# Patient Record
Sex: OTHER/UNKNOWN | Race: WHITE | NOT HISPANIC OR LATINO | Employment: OTHER | URBAN - METROPOLITAN AREA
[De-identification: names, ages, dates, MRNs, and addresses within clinical notes are randomized per-mention and may not be internally consistent; named-entity substitution may affect disease eponyms.]

---

## 2018-02-05 NOTE — PATIENT DISCUSSION
Trial framed mirtha MERCER and pt greatly apprecited. Very bothered by distance VA. Wants Mirtha MERCER. Poss LVC with DWS. Pt feels like he is non-adapting to monovision.

## 2018-02-13 NOTE — PATIENT DISCUSSION
Patient desires better uncorrected distance vision. Recommend LRI OD to treat residual astigmatism. Recommend contact lens trial OS to improve near vision and determine tolerance for mono vision. Patient would like to be able to read without glasses as well, if he can adapt. If he appreciates contact lens trial, recommend Lasik for better near vision OS. If he cannot adapt to mono vision, patient educated we would do Lasik on OS for distance vision and he would need reading glasses. Patient elects to proceed with limbal relaxing incision OD, goal of emmetropia.

## 2018-04-13 NOTE — PATIENT DISCUSSION
Pt greatly appreciated the CL for DVO. Wants to have LVC for beckie OS- BARBARA has spoken to him about this prior. Non-adapt to monovision.

## 2018-04-18 NOTE — PATIENT DISCUSSION
The patient desires to have better distance vision. Patient educated on option of Lasik in order to improve distance vision. Patient elects to proceed with Lasik OS, goal of emmetropia.

## 2018-04-18 NOTE — PATIENT DISCUSSION
The patient desires to have better distance vision. Patient educated on option of Lasik in order to improve distance vision. Patient elects to proceed with Lasik OD, goal of emmetropia.

## 2018-06-20 NOTE — PROCEDURE NOTE: CLINICAL
PROCEDURE NOTE: Punctal Plugs, Silicone #2 Bilateral Lower Lids. Diagnosis: Dry Eye Syndrome. Anesthesia: Topical. Prep: Antibiotic Drops q 5min x 3. Prior to treatment, the risks/benefits/alternatives were discussed. The patient wished to proceed with procedure. Permanent silicone plugs were inserted. Patient tolerated procedure well. There were no complications. Post procedure instructions given. 0.5mm Eagle.

## 2018-10-08 ENCOUNTER — PREPPED CHART (OUTPATIENT)
Dept: URBAN - METROPOLITAN AREA CLINIC 32 | Facility: CLINIC | Age: 63
End: 2018-10-08

## 2019-12-06 ASSESSMENT — VISUAL ACUITY
OS_CC: 20/20
OD_CC: J1+
OD_CC: 20/20
OS_CC: J1+

## 2019-12-06 ASSESSMENT — TONOMETRY
OD_IOP_MMHG: 18
OS_IOP_MMHG: 17

## 2019-12-09 ENCOUNTER — ESTABLISHED COMPREHENSIVE EXAM (OUTPATIENT)
Dept: URBAN - METROPOLITAN AREA CLINIC 32 | Facility: CLINIC | Age: 64
End: 2019-12-09

## 2019-12-09 DIAGNOSIS — H25.13: ICD-10-CM

## 2019-12-09 PROCEDURE — 92015 DETERMINE REFRACTIVE STATE: CPT

## 2019-12-09 PROCEDURE — 92014 COMPRE OPH EXAM EST PT 1/>: CPT

## 2019-12-09 ASSESSMENT — KERATOMETRY
OS_AXISANGLE2_DEGREES: 73
OD_AXISANGLE_DEGREES: 80
OS_K2POWER_DIOPTERS: 44
OD_K1POWER_DIOPTERS: 43.5
OD_AXISANGLE2_DEGREES: 170
OS_K1POWER_DIOPTERS: 43.75
OD_K2POWER_DIOPTERS: 44.25
OS_AXISANGLE_DEGREES: 163

## 2019-12-09 ASSESSMENT — VISUAL ACUITY
OD_BAT: 20/40
OD_SC: J16
OS_CC: J1+
OD_CC: J1+
OD_SC: 20/40
OS_SC: 20/40
OS_CC: 20/20
OS_BAT: 20/40
OS_SC: J10
OD_CC: 20/20

## 2019-12-09 ASSESSMENT — TONOMETRY
OS_IOP_MMHG: 16
OD_IOP_MMHG: 18

## 2022-02-03 NOTE — PROCEDURE NOTE: CLINICAL
PROCEDURE NOTE: Punctal Plugs, Noelle Lavender ([id number], [id number]) #1 Left Lower Lid. Prior to treatment, the risks/benefits/alternatives were discussed. The patient wished to proceed with procedure. Temporary collagen plugs were inserted. Patient tolerated procedure well. There were no complications. Post procedure instructions given. Sheila Wyatt PROCEDURE NOTE: Punctal Plugs, Quintess Dissolvable ([id number], [id number]) #1 Right Lower Lid. Prior to treatment, the risks/benefits/alternatives were discussed. The patient wished to proceed with procedure. Temporary collagen plugs were inserted. Patient tolerated procedure well. There were no complications. Post procedure instructions given. Sheila Wyatt

## 2022-08-12 NOTE — PROCEDURE NOTE: CLINICAL
PROCEDURE NOTE: Punctal Plugs, Silicone #1 Left Lower Lid. Anesthesia: Topical. Prep: Antibiotic Drops q 5min x 3. Prior to treatment, the risks/benefits/alternatives were discussed. The patient wished to proceed with procedure. One drop of proparacaine was placed and a drop of lidocaine gel was placed over the puncta. 0.* mm permanent silicone plugs were inserted in * eyelids. Lot # * and exp *  Patient tolerated procedure well. There were no complications. Post procedure instructions given. Danny So PROCEDURE NOTE: Punctal Plugs, Silicone #1 Right Lower Lid. Anesthesia: Topical. Prep: Antibiotic Drops q 5min x 3. Prior to treatment, the risks/benefits/alternatives were discussed. The patient wished to proceed with procedure. One drop of proparacaine was placed and a drop of lidocaine gel was placed over the puncta. 0.* mm permanent silicone plugs were inserted in * eyelids. Lot # * and exp *  Patient tolerated procedure well. There were no complications. Post procedure instructions given. Danny So

## 2023-03-01 NOTE — PATIENT DISCUSSION
Quality 110: Preventive Care And Screening: Influenza Immunization: Influenza Immunization Administered during Influenza season The IOP is in the target range. Detail Level: Detailed Quality 226: Preventive Care And Screening: Tobacco Use: Screening And Cessation Intervention: Patient screened for tobacco use and is an ex/non-smoker Quality 47: Advance Care Plan: Advance care planning not documented, reason not otherwise specified. Quality 111:Pneumonia Vaccination Status For Older Adults: Pneumococcal Vaccination Previously Received Quality 130: Documentation Of Current Medications In The Medical Record: Current Medications Documented Quality 431: Preventive Care And Screening: Unhealthy Alcohol Use - Screening: Patient screened for unhealthy alcohol use using a single question and scores less than 2 times per year 0